# Patient Record
Sex: FEMALE | Race: WHITE | HISPANIC OR LATINO
[De-identification: names, ages, dates, MRNs, and addresses within clinical notes are randomized per-mention and may not be internally consistent; named-entity substitution may affect disease eponyms.]

---

## 2023-08-29 ENCOUNTER — NON-APPOINTMENT (OUTPATIENT)
Age: 28
End: 2023-08-29

## 2024-01-17 ENCOUNTER — NON-APPOINTMENT (OUTPATIENT)
Age: 29
End: 2024-01-17

## 2024-02-02 ENCOUNTER — APPOINTMENT (OUTPATIENT)
Dept: ORTHOPEDIC SURGERY | Facility: CLINIC | Age: 29
End: 2024-02-02
Payer: COMMERCIAL

## 2024-02-02 VITALS — BODY MASS INDEX: 37.09 KG/M2 | WEIGHT: 184 LBS | HEIGHT: 59 IN

## 2024-02-02 DIAGNOSIS — Z78.9 OTHER SPECIFIED HEALTH STATUS: ICD-10-CM

## 2024-02-02 DIAGNOSIS — D64.9 ANEMIA, UNSPECIFIED: ICD-10-CM

## 2024-02-02 DIAGNOSIS — S69.90XA UNSPECIFIED INJURY OF UNSPECIFIED WRIST, HAND AND FINGER(S), INITIAL ENCOUNTER: ICD-10-CM

## 2024-02-02 PROBLEM — Z00.00 ENCOUNTER FOR PREVENTIVE HEALTH EXAMINATION: Status: ACTIVE | Noted: 2024-02-02

## 2024-02-02 PROCEDURE — 99203 OFFICE O/P NEW LOW 30 MIN: CPT

## 2024-02-02 NOTE — IMAGING
[de-identified] : Right wrist with mild swelling, +ttp at distal pole of scaphoid. Able to make fist, oppose thumb to small finger and abduct fingers. Sensation intact throughout. <2sec cap refill.  Right wrist and forearm radiographs with no overt fracture nor dislocation. Carpus aligned.

## 2024-02-02 NOTE — HISTORY OF PRESENT ILLNESS
[de-identified] : Age: 28 year F PMHx: Anemia Hand Dominance: RHD Chief Complaint: right arm/forearm pain s/p trauma 01/17/24. Patient reports that on 01/17/24, she had slipped on ice getting out of her car, landing on her right arm. Patient reports that she was seen at [facility] on 01/18/24 and had radiographs performed as well as a fiberglass cast placed. Reports diffuse numbness/tingling throughout her wrist and fingers. Trauma: yes Outside Imaging/Treatment: X-rays at [facility] 01/18/24 (forearm and wrist) OTC Medications: Ibuprofen PRN with relief OT/PT: none Bracing: arm currently wrapped Pain worse with: exertion, fine motor movement Pain better with:  rest

## 2024-02-02 NOTE — ASSESSMENT
[FreeTextEntry1] : Right wrist injury with concern for scaphoid fracture - reviewed pathoanatomy with patient and discussed management ladder to include NSAIDs prn, activity modification, OT, thumb spica. Will obtain right wrist MRI without contrast. Will follow-up thereafter to discuss results and develop plan of care.  F/u after MRI

## 2024-02-10 ENCOUNTER — RESULT REVIEW (OUTPATIENT)
Age: 29
End: 2024-02-10